# Patient Record
Sex: FEMALE | Race: ASIAN | NOT HISPANIC OR LATINO | URBAN - METROPOLITAN AREA
[De-identification: names, ages, dates, MRNs, and addresses within clinical notes are randomized per-mention and may not be internally consistent; named-entity substitution may affect disease eponyms.]

---

## 2023-05-03 ENCOUNTER — INPATIENT (INPATIENT)
Facility: HOSPITAL | Age: 78
LOS: 1 days | Discharge: HOME | DRG: 203 | End: 2023-05-05
Attending: STUDENT IN AN ORGANIZED HEALTH CARE EDUCATION/TRAINING PROGRAM | Admitting: STUDENT IN AN ORGANIZED HEALTH CARE EDUCATION/TRAINING PROGRAM
Payer: MEDICARE

## 2023-05-03 VITALS
OXYGEN SATURATION: 98 % | SYSTOLIC BLOOD PRESSURE: 154 MMHG | TEMPERATURE: 98 F | DIASTOLIC BLOOD PRESSURE: 80 MMHG | HEART RATE: 76 BPM | RESPIRATION RATE: 20 BRPM

## 2023-05-03 DIAGNOSIS — R06.00 DYSPNEA, UNSPECIFIED: ICD-10-CM

## 2023-05-03 LAB
ALBUMIN SERPL ELPH-MCNC: 4.4 G/DL — SIGNIFICANT CHANGE UP (ref 3.5–5.2)
ALP SERPL-CCNC: 49 U/L — SIGNIFICANT CHANGE UP (ref 30–115)
ALT FLD-CCNC: 27 U/L — SIGNIFICANT CHANGE UP (ref 0–41)
ANION GAP SERPL CALC-SCNC: 12 MMOL/L — SIGNIFICANT CHANGE UP (ref 7–14)
AST SERPL-CCNC: 30 U/L — SIGNIFICANT CHANGE UP (ref 0–41)
BASOPHILS # BLD AUTO: 0.07 K/UL — SIGNIFICANT CHANGE UP (ref 0–0.2)
BASOPHILS NFR BLD AUTO: 1 % — SIGNIFICANT CHANGE UP (ref 0–1)
BILIRUB SERPL-MCNC: 0.4 MG/DL — SIGNIFICANT CHANGE UP (ref 0.2–1.2)
BUN SERPL-MCNC: 21 MG/DL — HIGH (ref 10–20)
CALCIUM SERPL-MCNC: 9.3 MG/DL — SIGNIFICANT CHANGE UP (ref 8.4–10.4)
CHLORIDE SERPL-SCNC: 104 MMOL/L — SIGNIFICANT CHANGE UP (ref 98–110)
CO2 SERPL-SCNC: 24 MMOL/L — SIGNIFICANT CHANGE UP (ref 17–32)
CREAT SERPL-MCNC: 0.8 MG/DL — SIGNIFICANT CHANGE UP (ref 0.7–1.5)
EGFR: 75 ML/MIN/1.73M2 — SIGNIFICANT CHANGE UP
EOSINOPHIL # BLD AUTO: 0.33 K/UL — SIGNIFICANT CHANGE UP (ref 0–0.7)
EOSINOPHIL NFR BLD AUTO: 4.5 % — SIGNIFICANT CHANGE UP (ref 0–8)
FLUAV AG NPH QL: SIGNIFICANT CHANGE UP
FLUBV AG NPH QL: SIGNIFICANT CHANGE UP
GLUCOSE SERPL-MCNC: 165 MG/DL — HIGH (ref 70–99)
HCT VFR BLD CALC: 34.6 % — LOW (ref 37–47)
HGB BLD-MCNC: 11.6 G/DL — LOW (ref 12–16)
IMM GRANULOCYTES NFR BLD AUTO: 0.3 % — SIGNIFICANT CHANGE UP (ref 0.1–0.3)
LYMPHOCYTES # BLD AUTO: 1.7 K/UL — SIGNIFICANT CHANGE UP (ref 1.2–3.4)
LYMPHOCYTES # BLD AUTO: 23.3 % — SIGNIFICANT CHANGE UP (ref 20.5–51.1)
MCHC RBC-ENTMCNC: 31.5 PG — HIGH (ref 27–31)
MCHC RBC-ENTMCNC: 33.5 G/DL — SIGNIFICANT CHANGE UP (ref 32–37)
MCV RBC AUTO: 94 FL — SIGNIFICANT CHANGE UP (ref 81–99)
MONOCYTES # BLD AUTO: 0.61 K/UL — HIGH (ref 0.1–0.6)
MONOCYTES NFR BLD AUTO: 8.3 % — SIGNIFICANT CHANGE UP (ref 1.7–9.3)
NEUTROPHILS # BLD AUTO: 4.58 K/UL — SIGNIFICANT CHANGE UP (ref 1.4–6.5)
NEUTROPHILS NFR BLD AUTO: 62.6 % — SIGNIFICANT CHANGE UP (ref 42.2–75.2)
NRBC # BLD: 0 /100 WBCS — SIGNIFICANT CHANGE UP (ref 0–0)
NT-PROBNP SERPL-SCNC: 220 PG/ML — SIGNIFICANT CHANGE UP (ref 0–300)
PLATELET # BLD AUTO: 237 K/UL — SIGNIFICANT CHANGE UP (ref 130–400)
PMV BLD: 10 FL — SIGNIFICANT CHANGE UP (ref 7.4–10.4)
POTASSIUM SERPL-MCNC: 4.2 MMOL/L — SIGNIFICANT CHANGE UP (ref 3.5–5)
POTASSIUM SERPL-SCNC: 4.2 MMOL/L — SIGNIFICANT CHANGE UP (ref 3.5–5)
PROT SERPL-MCNC: 7.1 G/DL — SIGNIFICANT CHANGE UP (ref 6–8)
RBC # BLD: 3.68 M/UL — LOW (ref 4.2–5.4)
RBC # FLD: 13.2 % — SIGNIFICANT CHANGE UP (ref 11.5–14.5)
RSV RNA NPH QL NAA+NON-PROBE: SIGNIFICANT CHANGE UP
SARS-COV-2 RNA SPEC QL NAA+PROBE: SIGNIFICANT CHANGE UP
SODIUM SERPL-SCNC: 140 MMOL/L — SIGNIFICANT CHANGE UP (ref 135–146)
TROPONIN T SERPL-MCNC: <0.01 NG/ML — SIGNIFICANT CHANGE UP
WBC # BLD: 7.31 K/UL — SIGNIFICANT CHANGE UP (ref 4.8–10.8)
WBC # FLD AUTO: 7.31 K/UL — SIGNIFICANT CHANGE UP (ref 4.8–10.8)

## 2023-05-03 PROCEDURE — 36415 COLL VENOUS BLD VENIPUNCTURE: CPT

## 2023-05-03 PROCEDURE — 97162 PT EVAL MOD COMPLEX 30 MIN: CPT | Mod: GP

## 2023-05-03 PROCEDURE — 94640 AIRWAY INHALATION TREATMENT: CPT

## 2023-05-03 PROCEDURE — 93306 TTE W/DOPPLER COMPLETE: CPT

## 2023-05-03 PROCEDURE — 71045 X-RAY EXAM CHEST 1 VIEW: CPT | Mod: 26

## 2023-05-03 PROCEDURE — 80053 COMPREHEN METABOLIC PANEL: CPT

## 2023-05-03 PROCEDURE — 84484 ASSAY OF TROPONIN QUANT: CPT

## 2023-05-03 PROCEDURE — 85025 COMPLETE CBC W/AUTO DIFF WBC: CPT

## 2023-05-03 PROCEDURE — 84145 PROCALCITONIN (PCT): CPT

## 2023-05-03 PROCEDURE — 83735 ASSAY OF MAGNESIUM: CPT

## 2023-05-03 PROCEDURE — 99285 EMERGENCY DEPT VISIT HI MDM: CPT

## 2023-05-03 PROCEDURE — 71275 CT ANGIOGRAPHY CHEST: CPT | Mod: 26,MA

## 2023-05-03 RX ORDER — IPRATROPIUM/ALBUTEROL SULFATE 18-103MCG
3 AEROSOL WITH ADAPTER (GRAM) INHALATION
Refills: 0 | Status: DISCONTINUED | OUTPATIENT
Start: 2023-05-03 | End: 2023-05-05

## 2023-05-03 RX ADMIN — Medication 125 MILLIGRAM(S): at 20:01

## 2023-05-03 RX ADMIN — Medication 3 MILLILITER(S): at 21:12

## 2023-05-03 RX ADMIN — Medication 3 MILLILITER(S): at 20:01

## 2023-05-03 NOTE — ED PROVIDER NOTE - PHYSICAL EXAMINATION
CONSTITUTIONAL: Well-appearing; well-nourished; in no apparent distress.   EYES: PERRL; EOM intact.   CARDIOVASCULAR: Normal S1, S2; no murmurs, rubs, or gallops.   RESPIRATORY: Diffuse bilateral expiratory wheezing.  Respiratory rate 16.  No accessory muscle use.  GI/: Normal bowel sounds; non-distended; non-tender; no palpable organomegaly.   MS: No calf swelling and tenderness.  No pitting edema to lower extremities.  SKIN: Normal for age and race; warm; dry; good turgor; no apparent lesions or exudate.   NEURO/PSYCH: A & O x 4; grossly unremarkable.

## 2023-05-03 NOTE — ED PROVIDER NOTE - OBJECTIVE STATEMENT
77 years old female history of hypertension, high cholesterol present complaint worsening loss of breath and coughing over the past 10 days.  Per son, patient has been having coughing for months and had an outpatient CT scan of chest about 5 months ago which fine bilateral apical granulomatosis and an enlarged pericarina lymph node 1.2cm.  Patient was taking albuterol nebulizer at home over the past 10 days with no improvement of shortness of breath so son brought patient to ED for evaluation.  Son also reports productive clear sputum.  Otherwise denies fever, chills, chest pain, abdominal pain, nausea, vomiting, diarrhea or urinary symptoms.

## 2023-05-03 NOTE — ED ADULT NURSE NOTE - OBJECTIVE STATEMENT
Pt is a 77 year old female c/o worsening SOB. Pts family member states she has been feeling SOB for a couple of months but it got worse today/ Pt is sitting comfortably in bed in no acute distress. Family at bedside

## 2023-05-03 NOTE — ED PROVIDER NOTE - CLINICAL SUMMARY MEDICAL DECISION MAKING FREE TEXT BOX
Laboratory results reviewed and discussed with patient. CBC shows normal WBC count, Hb/Hct and plateelet count are WNL. BMP shows electrolytes WNL and no YULI. Troponin is negative.  EKG shows sinus rhythm, intervals are in acceptable range, no significant ST/T wave changes noted.   CXR shows no PTX, no effusion, no pneumonia, chronic changes.   Patient with TORRES with minimal exertion, like moving around in the stretcher or attempting to walk few steps, which has been worse over the last week and even more worse today, so had come to ED for evaluation.   Patient remained hemodynamically stable during the course of ED stay. Discussed with patient about the results of the diagnostic studies. Discussed with admitting physician and MAR, patient is admitted to Medicine for further evaluation and care.

## 2023-05-03 NOTE — ED PROVIDER NOTE - ATTENDING APP SHARED VISIT CONTRIBUTION OF CARE
77-year-old female history of hypertension, hyperlipidemia, recently being treated for asthma with montelukast by her family medicine provider presenting today with shortness of breath.  Patient has had a cough for over 3 months, endorses exertional shortness of breath for the last 2 to 3 weeks however shortness of breath worsened acutely this evening.  Denies chest pain, denies any fevers.  Denies exertional chest pain.  Denies smoking.    CONSTITUTIONAL: Well-developed; well-nourished; in no acute distress.   SKIN: warm, dry  HEAD: Normocephalic; atraumatic.  EYES: PERRL, EOMI, no conjunctival erythema  ENT: No nasal discharge; airway clear.  NECK: Supple; non tender.  CARD: S1, S2 normal; no murmurs, gallops, or rubs. Regular rate and rhythm.   RESP: normal work of breathing, expiratory wheezing faintly. LUQ crackles noted.   ABD: soft ntnd.  EXT: Normal ROM.  No clubbing, cyanosis or edema.   NEURO: Alert, oriented, grossly unremarkable.  PSYCH: Cooperative, appropriate.

## 2023-05-04 PROCEDURE — 93306 TTE W/DOPPLER COMPLETE: CPT | Mod: 26

## 2023-05-04 PROCEDURE — 99222 1ST HOSP IP/OBS MODERATE 55: CPT

## 2023-05-04 RX ORDER — BUDESONIDE AND FORMOTEROL FUMARATE DIHYDRATE 160; 4.5 UG/1; UG/1
2 AEROSOL RESPIRATORY (INHALATION)
Refills: 0 | Status: DISCONTINUED | OUTPATIENT
Start: 2023-05-04 | End: 2023-05-04

## 2023-05-04 RX ORDER — IPRATROPIUM/ALBUTEROL SULFATE 18-103MCG
3 AEROSOL WITH ADAPTER (GRAM) INHALATION EVERY 6 HOURS
Refills: 0 | Status: DISCONTINUED | OUTPATIENT
Start: 2023-05-04 | End: 2023-05-05

## 2023-05-04 RX ORDER — MONTELUKAST 4 MG/1
1 TABLET, CHEWABLE ORAL
Refills: 0 | DISCHARGE

## 2023-05-04 RX ORDER — ACETAMINOPHEN 500 MG
650 TABLET ORAL EVERY 6 HOURS
Refills: 0 | Status: DISCONTINUED | OUTPATIENT
Start: 2023-05-04 | End: 2023-05-05

## 2023-05-04 RX ORDER — LANOLIN ALCOHOL/MO/W.PET/CERES
3 CREAM (GRAM) TOPICAL AT BEDTIME
Refills: 0 | Status: DISCONTINUED | OUTPATIENT
Start: 2023-05-04 | End: 2023-05-05

## 2023-05-04 RX ORDER — GUAIFENESIN/DEXTROMETHORPHAN 600MG-30MG
10 TABLET, EXTENDED RELEASE 12 HR ORAL EVERY 6 HOURS
Refills: 0 | Status: DISCONTINUED | OUTPATIENT
Start: 2023-05-04 | End: 2023-05-05

## 2023-05-04 RX ORDER — ATORVASTATIN CALCIUM 80 MG/1
20 TABLET, FILM COATED ORAL AT BEDTIME
Refills: 0 | Status: DISCONTINUED | OUTPATIENT
Start: 2023-05-04 | End: 2023-05-05

## 2023-05-04 RX ORDER — ENOXAPARIN SODIUM 100 MG/ML
40 INJECTION SUBCUTANEOUS EVERY 24 HOURS
Refills: 0 | Status: DISCONTINUED | OUTPATIENT
Start: 2023-05-04 | End: 2023-05-05

## 2023-05-04 RX ORDER — ATENOLOL 25 MG/1
12.5 TABLET ORAL DAILY
Refills: 0 | Status: DISCONTINUED | OUTPATIENT
Start: 2023-05-04 | End: 2023-05-05

## 2023-05-04 RX ORDER — LOSARTAN POTASSIUM 100 MG/1
50 TABLET, FILM COATED ORAL DAILY
Refills: 0 | Status: DISCONTINUED | OUTPATIENT
Start: 2023-05-04 | End: 2023-05-05

## 2023-05-04 RX ORDER — ONDANSETRON 8 MG/1
4 TABLET, FILM COATED ORAL EVERY 8 HOURS
Refills: 0 | Status: DISCONTINUED | OUTPATIENT
Start: 2023-05-04 | End: 2023-05-05

## 2023-05-04 RX ORDER — OLMESARTAN MEDOXOMIL 5 MG/1
1 TABLET, FILM COATED ORAL
Refills: 0 | DISCHARGE

## 2023-05-04 RX ORDER — ATENOLOL 25 MG/1
0.5 TABLET ORAL
Refills: 0 | DISCHARGE

## 2023-05-04 RX ORDER — ATORVASTATIN CALCIUM 80 MG/1
1 TABLET, FILM COATED ORAL
Refills: 0 | DISCHARGE

## 2023-05-04 RX ADMIN — ATENOLOL 12.5 MILLIGRAM(S): 25 TABLET ORAL at 05:39

## 2023-05-04 RX ADMIN — Medication 60 MILLIGRAM(S): at 05:40

## 2023-05-04 RX ADMIN — Medication 100 MILLIGRAM(S): at 13:14

## 2023-05-04 RX ADMIN — ENOXAPARIN SODIUM 40 MILLIGRAM(S): 100 INJECTION SUBCUTANEOUS at 13:15

## 2023-05-04 RX ADMIN — Medication 100 MILLIGRAM(S): at 21:19

## 2023-05-04 RX ADMIN — LOSARTAN POTASSIUM 50 MILLIGRAM(S): 100 TABLET, FILM COATED ORAL at 05:39

## 2023-05-04 RX ADMIN — ATORVASTATIN CALCIUM 20 MILLIGRAM(S): 80 TABLET, FILM COATED ORAL at 21:18

## 2023-05-04 RX ADMIN — Medication 3 MILLILITER(S): at 20:43

## 2023-05-04 NOTE — PATIENT PROFILE ADULT - FALL HARM RISK - HARM RISK INTERVENTIONS

## 2023-05-04 NOTE — H&P ADULT - NSHPREVIEWOFSYSTEMS_GEN_ALL_CORE
Dialect not interpretable by  REVIEW OF SYSTEMS:    CONSTITUTIONAL:  No weakness, fevers or chills  EYES/ENT:  No visual changes;  No vertigo or throat pain   NECK:  No pain or stiffness  RESPIRATORY:  + shortness of breath, + wheeze  CARDIOVASCULAR:  No chest pain or palpitations  GASTROINTESTINAL:  No abdominal or epigastric pain. No nausea, vomiting, or hematemesis; No diarrhea or constipation. No melena or hematochezia.  GENITOURINARY:  No dysuria, frequency or hematuria  NEUROLOGICAL:  No numbness or weakness  SKIN:  No itching, rashes

## 2023-05-04 NOTE — H&P ADULT - HISTORY OF PRESENT ILLNESS
77-year-old female    PMH:  history of hypertension, hyperlipidemia, recently being treated for asthma with montelukast     Presenting today with shortness of breath.      Patient has had a cough for over 3 months, endorses exertional shortness of breath for the last 2 to 3 weeks however shortness of breath worsened acutely this evening.  Denies chest pain, denies any fevers.  Denies exertional chest pain.  Denies smoking    In the ED :  BP: 154/80  HR : 76  RR : 20  T : 98  O2: 98% on 2L NC    Trop <0.01x1   BNP : 220  RVP : neg     CT Angio :   No evidence of acute pulmonary embolism.  Bilateral upper lobe opacities and pleural thickening, which may correlate to scarring and infectious etiology, such as old granulomatous disease.  Mildly dilated upper to mid esophagus, may consider correlation with direct visualization.    ECG: Normal sinus rhythm  Right axis deviation  Pulmonary disease pattern  Possible Septal infarct , age undetermined    Attempted to call son - not available by phone    77-year-old female    PMH:  history of hypertension, hyperlipidemia, asthma     Presenting today with shortness of breath.      Patient has had a cough for over 3 months, endorses exertional shortness of breath for the last 2 to 3 weeks however shortness of breath worsened acutely this evening.  Denies chest pain, denies any fevers.  Denies exertional chest pain.  Denies smoking    In the ED :  BP: 154/80  HR : 76  RR : 20  T : 98  O2: 98% on 2L NC    Trop <0.01x1   BNP : 220  RVP : neg     CT Angio :   No evidence of acute pulmonary embolism.  Bilateral upper lobe opacities and pleural thickening, which may correlate to scarring and infectious etiology, such as old granulomatous disease.  Mildly dilated upper to mid esophagus, may consider correlation with direct visualization.    ECG: Normal sinus rhythm  Right axis deviation  Pulmonary disease pattern  Possible Septal infarct , age undetermined    Attempted to call son - not available by phone

## 2023-05-04 NOTE — CONSULT NOTE ADULT - ASSESSMENT
IMPRESSION    Acute bronchitis    RECOMMENDATIONS    CT shows some B/l upper lobe scarring, this is not related to her symptoms.  She is 94% on RA.  If her cough is bothering can give her anti-tussive medications such as benzonatate.  If her SOB gets worse and she requires oxygen evaluate for PNA; right now there is none.  DVT PPX.  Recall PRN. IMPRESSION      Upper lobe scarring; pleural thickening on CT   Cough  Shortness of breath with exertion   History of asthma     RECOMMENDATIONS    CT shows some B/l upper lobe scarring, likely chronic; outpatient CT in 6 months.   She is 94% on RA after ambulation; no need for O2  She has a cough; CT with no consolidation; check procal; observe off abx for now   Recommend symbicort 80 as needed if she has a history of asthma; rinse mouth after use   Can use benzonatate for the cough  DVT ppx; early ambulation  No PE on CTA  Recall PRN  IMPRESSION      Upper lobe scarring; pleural thickening on CT   Cough  Shortness of breath with exertion   History of asthma     RECOMMENDATIONS    CT shows some B/l upper lobe scarring, likely chronic; outpatient CT in 6 months.   She is 94% on RA after ambulation; no need for O2  She has a cough; CT with no consolidation; check procal; observe off abx for now   Recommend symbicort 80 as needed if she has a history of asthma; rinse mouth after use   Can use benzonatate for the cough  Rule out GERD; post nasal drip at the cause of cough  DVT ppx; early ambulation  No PE on CTA  Recall PRN

## 2023-05-04 NOTE — CONSULT NOTE ADULT - ATTENDING COMMENTS
IMPRESSION      Upper lobe scarring; pleural thickening on CT   Cough  Shortness of breath with exertion   History of asthma     Impression and plan above are my own.

## 2023-05-04 NOTE — H&P ADULT - ATTENDING COMMENTS
my note supersedes residents incase of discrepancy    77-year-old female    with Hx of Membranous GN, hypertension, hyperlipidemia, asthma, presented with SOB for few weeks and cough for past few days    pt is mandarin speaker, interpretation used, ID [id number]     #AHRF: was on NC currenlty on RA  #acute asthma exacerbation 2/2 viral bronchitis   #CT angio Bilateral upper lobe opacities and pleural thickening, which may   correlate to scarring and infectious etiology, such as old granulomatous   disease.  - c/w w steroid and Nebs  - on RA can be dc today, requestoing to [facility] one more day given TORRES  - pending Pulm eval  - BNP wnl, exam not concerning for fluid overload  - check Procal     # HT/ HLD   c/w home meds    # DVT : Lovenox   # Diet : DASH  # Activity : [facility] - PT eval   # Dispo : From home  # Code : Full my note supersedes residents incase of discrepancy    77-year-old female    with Hx of Membranous GN, hypertension, hyperlipidemia, asthma, presented with SOB for few weeks and cough for past few days    pt is mandarin speaker, interpretation used, ID [id number]     #AHRF: was on NC currenlty on RA  #acute asthma exacerbation 2/2 viral bronchitis   #CT angio Bilateral upper lobe opacities and pleural thickening, which may   correlate to scarring and infectious etiology, such as old granulomatous   disease.  - c/w w steroid and Nebs  - on RA can be dc today, requestoing to [facility] one more day given TORRES  - pending Pulm eval  - BNP wnl, exam not concerning for fluid overload  - check Procal     #mild dilation of esophagus on CT scan: outpt eval by GI   # HTN/ HLD   c/w home meds    # DVT : Lovenox   # Diet : DASH  # Activity : [facility] - PT eval   # Dispo : From home  # Code : Full

## 2023-05-04 NOTE — PHYSICAL THERAPY INITIAL EVALUATION ADULT - PERTINENT HX OF CURRENT PROBLEM, REHAB EVAL
· Assessment    77-year-old female    PMH:  history of hypertension, hyperlipidemia, recently being treated for asthma with montelukast     Presenting today with shortness of breath.     Pt. referred to PT for eval and tx.

## 2023-05-04 NOTE — CONSULT NOTE ADULT - CONSULT REASON
new onset SOB   CT- chest : Bilateral upper lobe opacities and pleural thickening, which may correlate to scarring and infectious etiology, such as old granulomatous disease.

## 2023-05-04 NOTE — PHYSICAL THERAPY INITIAL EVALUATION ADULT - GENERAL OBSERVATIONS, REHAB EVAL
[id number] am Chart reviewed. Pt. seen semirecline in bed , + O2 at 2L/min nc , IV lock, used Mandarin  [id number], Pt. agreed to activity/therapy.

## 2023-05-04 NOTE — H&P ADULT - ASSESSMENT
77-year-old female    PMH:  history of hypertension, hyperlipidemia, recently being treated for asthma with montelukast     Presenting today with shortness of breath.      # SOB  # Hx of Asthma - possible exacerbation   # Granulomatous disease on CT   - O2: 98% on 2L NC  - Trop <0.01x1 - trend , BNP : 220  - RVP : neg   - CT Angio : No evidence of acute pulmonary embolism.  - Bilateral upper lobe opacities and pleural thickening, which may correlate to scarring and infectious etiology, such as old granulomatous disease.  - Mildly dilated upper to mid esophagus, may consider correlation with direct visualization.  - ECG: Pulmonary disease pattern  - denies smoking     - Duoneb q6  - Solumedrol 60 IV daily   - f/u Pulm reccs     # HT/ HLD   c/w home meds    # DVT : Lovenox   # Diet : DASH  # Activity : [facility] - PT eval   # Dispo : From home  # Code : Full           77-year-old female    PMH:  history of hypertension, hyperlipidemia, recently being treated for asthma with montelukast     Presenting today with shortness of breath.      # SOB  # Hx of Asthma - possible exacerbation   # Granulomatous disease on CT   - O2: 98% on 2L NC  - Trop <0.01x1 - trend , BNP : 220  - RVP : neg   - CT Angio : No evidence of acute pulmonary embolism.  - Bilateral upper lobe opacities and pleural thickening, which may correlate to scarring and infectious etiology, such as old granulomatous disease.  - Mildly dilated upper to mid esophagus, may consider correlation with direct visualization.  - ECG: Pulmonary disease pattern  - denies smoking     - Duoneb q6   - Solumedrol 60 IV daily   - f/u TTE  - f/u Pulm reccs     # HT/ HLD   c/w home meds    # DVT : Lovenox   # Diet : DASH  # Activity : [facility] - PT eval   # Dispo : From home  # Code : Full

## 2023-05-04 NOTE — H&P ADULT - NSHPPHYSICALEXAM_GEN_ALL_CORE
CONSTITUTIONAL: Well-developed; well-nourished; in no acute distress.   SKIN: warm, dry  HEAD: Normocephalic; atraumatic.  EYES: PERRL, EOMI, no conjunctival erythema  ENT: No nasal discharge; airway clear.  NECK: Supple; non tender.  CARD: S1, S2 normal; no murmurs, gallops, or rubs. Regular rate and rhythm.   RESP: normal work of breathing, expiratory wheezing faintly. LUQ crackles noted.   ABD: soft ntnd.  EXT: Normal ROM.  No clubbing, cyanosis or edema.   NEURO: Alert, oriented, grossly unremarkable.  PSYCH: Cooperative, appropriate. CONSTITUTIONAL: Well-developed; well-nourished; in no acute distress.   SKIN: warm, dry  HEAD: Normocephalic; atraumatic.  EYES: PERRL, EOMI, no conjunctival erythema  ENT: No nasal discharge; airway clear.  NECK: Supple; non tender.  CARD: S1, S2 normal; no murmurs, gallops, or rubs. Regular rate and rhythm.   RESP: normal work of breathing, expiratory wheezing faintly.   ABD: soft ntnd.  EXT: Normal ROM.  No clubbing, cyanosis or edema.   NEURO: Alert, oriented, grossly unremarkable.  PSYCH: Cooperative, appropriate.

## 2023-05-04 NOTE — CONSULT NOTE ADULT - SUBJECTIVE AND OBJECTIVE BOX
Patient is a 77y old  Female who presents with a chief complaint of TORRES (04 May 2023 03:08)      HPI:  77-year-old female    PMH:  history of hypertension, hyperlipidemia, asthma     Presenting today with shortness of breath.      Patient has had a cough for over 3 months, endorses exertional shortness of breath for the last 2 to 3 weeks however shortness of breath worsened acutely this evening.  Denies chest pain, denies any fevers.  Denies exertional chest pain.  Denies smoking    In the ED :  BP: 154/80  HR : 76  RR : 20  T : 98  O2: 98% on 2L NC    Trop <0.01x1   BNP : 220  RVP : neg     CT Angio :   No evidence of acute pulmonary embolism.  Bilateral upper lobe opacities and pleural thickening, which may correlate to scarring and infectious etiology, such as old granulomatous disease.  Mildly dilated upper to mid esophagus, may consider correlation with direct visualization.    ECG: Normal sinus rhythm  Right axis deviation  Pulmonary disease pattern  Possible Septal infarct , age undetermined    Attempted to call son - not available by phone    (04 May 2023 03:08)      PAST MEDICAL & SURGICAL HISTORY:  HTN (hypertension)          SOCIAL HX:   Smoking          never    FAMILY HISTORY:  .  No cardiovascular or pulmonary family history     REVIEW OF SYSTEMS:    All ROS are negative except per HPI     Allergies    penicillin (Rash; Urticaria)    Intolerances          PHYSICAL EXAM  Vital Signs Last 24 Hrs  T(C): 36.4 (04 May 2023 04:55), Max: 36.8 (03 May 2023 19:08)  T(F): 97.6 (04 May 2023 04:55), Max: 98.3 (03 May 2023 19:08)  HR: 83 (04 May 2023 04:55) (76 - 87)  BP: 143/69 (04 May 2023 04:55) (140/63 - 154/80)  RR: 18 (04 May 2023 04:55) (18 - 20)  SpO2: 96% (04 May 2023 04:55) (96% - 98%)    Parameters below as of 04 May 2023 04:55  Patient On (Oxygen Delivery Method): nasal cannula        CONSTITUTIONAL:  Well nourished.  NAD    ENT:   Airway patent,     EYES:   Clear bilaterally,   pupils equal,   round and reactive to light.    CARDIAC:   Normal rate,   regular rhythm.    no edema    RESPIRATORY:   No wheezing  Normal chest expansion  Not tachypneic,  No use of accessory muscles    GASTROINTESTINAL:  Abdomen soft,   non-tender,   no guarding,   + BS    MUSCULOSKELETAL:   range of motion is not limited,  no clubbing, cyanosis    NEUROLOGICAL:   Alert and oriented   no motor deficits.    SKIN:   No evidence of rash.    PSYCHIATRIC:   normal mood and affect.   no apparent risk to self or others.        LABS:                          11.6   7.31  )-----------( 237      ( 03 May 2023 20:11 )             34.6                                               05-03    140  |  104  |  21<H>  ----------------------------<  165<H>  4.2   |  24  |  0.8    Ca    9.3      03 May 2023 20:11    TPro  7.1  /  Alb  4.4  /  TBili  0.4  /  DBili  x   /  AST  30  /  ALT  27  /  AlkPhos  49  05-03                                                 CARDIAC MARKERS ( 03 May 2023 20:11 )  x     / <0.01 ng/mL / x     / x     / x                                                LIVER FUNCTIONS - ( 03 May 2023 20:11 )  Alb: 4.4 g/dL / Pro: 7.1 g/dL / ALK PHOS: 49 U/L / ALT: 27 U/L / AST: 30 U/L / GGT: x                                                                                                MEDICATIONS  (STANDING):  albuterol/ipratropium for Nebulization 3 milliLiter(s) Nebulizer every 6 hours  albuterol/ipratropium for Nebulization.. 3 milliLiter(s) Nebulizer every 20 minutes  atenolol  Tablet 12.5 milliGRAM(s) Oral daily  atorvastatin 20 milliGRAM(s) Oral at bedtime  enoxaparin Injectable 40 milliGRAM(s) SubCutaneous every 24 hours  losartan 50 milliGRAM(s) Oral daily  methylPREDNISolone sodium succinate Injectable 60 milliGRAM(s) IV Push daily    MEDICATIONS  (PRN):  acetaminophen     Tablet .. 650 milliGRAM(s) Oral every 6 hours PRN Temp greater or equal to 38C (100.4F), Mild Pain (1 - 3)  aluminum hydroxide/magnesium hydroxide/simethicone Suspension 30 milliLiter(s) Oral every 4 hours PRN Dyspepsia  melatonin 3 milliGRAM(s) Oral at bedtime PRN Insomnia  ondansetron Injectable 4 milliGRAM(s) IV Push every 8 hours PRN Nausea and/or Vomiting

## 2023-05-04 NOTE — H&P ADULT - NSHPLABSRESULTS_GEN_ALL_CORE
(05-03 @ 20:11)                      11.6  7.31 )-----------( 237                 34.6    Neutrophils = 4.58 (62.6%)  Lymphocytes = 1.70 (23.3%)  Eosinophils = 0.33 (4.5%)  Basophils = 0.07 (1.0%)  Monocytes = 0.61 (8.3%)  Bands = --%    05-03    140  |  104  |  21<H>  ----------------------------<  165<H>  4.2   |  24  |  0.8    Ca    9.3      03 May 2023 20:11    TPro  7.1  /  Alb  4.4  /  TBili  0.4  /  DBili  x   /  AST  30  /  ALT  27  /  AlkPhos  49  05-03      CARDIAC MARKERS ( 03 May 2023 20:11 )  Trop <0.01 ng/mL / CK x     / CKMB x

## 2023-05-05 ENCOUNTER — TRANSCRIPTION ENCOUNTER (OUTPATIENT)
Age: 78
End: 2023-05-05

## 2023-05-05 VITALS
HEART RATE: 75 BPM | SYSTOLIC BLOOD PRESSURE: 115 MMHG | OXYGEN SATURATION: 97 % | DIASTOLIC BLOOD PRESSURE: 56 MMHG | RESPIRATION RATE: 19 BRPM | TEMPERATURE: 98 F

## 2023-05-05 LAB
ALBUMIN SERPL ELPH-MCNC: 4.4 G/DL — SIGNIFICANT CHANGE UP (ref 3.5–5.2)
ALP SERPL-CCNC: 50 U/L — SIGNIFICANT CHANGE UP (ref 30–115)
ALT FLD-CCNC: 23 U/L — SIGNIFICANT CHANGE UP (ref 0–41)
ANION GAP SERPL CALC-SCNC: 11 MMOL/L — SIGNIFICANT CHANGE UP (ref 7–14)
AST SERPL-CCNC: 25 U/L — SIGNIFICANT CHANGE UP (ref 0–41)
BASOPHILS # BLD AUTO: 0.03 K/UL — SIGNIFICANT CHANGE UP (ref 0–0.2)
BASOPHILS NFR BLD AUTO: 0.2 % — SIGNIFICANT CHANGE UP (ref 0–1)
BILIRUB SERPL-MCNC: 0.8 MG/DL — SIGNIFICANT CHANGE UP (ref 0.2–1.2)
BUN SERPL-MCNC: 28 MG/DL — HIGH (ref 10–20)
CALCIUM SERPL-MCNC: 9 MG/DL — SIGNIFICANT CHANGE UP (ref 8.4–10.5)
CHLORIDE SERPL-SCNC: 106 MMOL/L — SIGNIFICANT CHANGE UP (ref 98–110)
CO2 SERPL-SCNC: 24 MMOL/L — SIGNIFICANT CHANGE UP (ref 17–32)
CREAT SERPL-MCNC: 0.8 MG/DL — SIGNIFICANT CHANGE UP (ref 0.7–1.5)
EGFR: 75 ML/MIN/1.73M2 — SIGNIFICANT CHANGE UP
EOSINOPHIL # BLD AUTO: 0 K/UL — SIGNIFICANT CHANGE UP (ref 0–0.7)
EOSINOPHIL NFR BLD AUTO: 0 % — SIGNIFICANT CHANGE UP (ref 0–8)
GLUCOSE SERPL-MCNC: 107 MG/DL — HIGH (ref 70–99)
HCT VFR BLD CALC: 35.1 % — LOW (ref 37–47)
HGB BLD-MCNC: 11.6 G/DL — LOW (ref 12–16)
IMM GRANULOCYTES NFR BLD AUTO: 0.5 % — HIGH (ref 0.1–0.3)
LYMPHOCYTES # BLD AUTO: 1.72 K/UL — SIGNIFICANT CHANGE UP (ref 1.2–3.4)
LYMPHOCYTES # BLD AUTO: 8.7 % — LOW (ref 20.5–51.1)
MAGNESIUM SERPL-MCNC: 2.3 MG/DL — SIGNIFICANT CHANGE UP (ref 1.8–2.4)
MCHC RBC-ENTMCNC: 31.9 PG — HIGH (ref 27–31)
MCHC RBC-ENTMCNC: 33 G/DL — SIGNIFICANT CHANGE UP (ref 32–37)
MCV RBC AUTO: 96.4 FL — SIGNIFICANT CHANGE UP (ref 81–99)
MONOCYTES # BLD AUTO: 0.37 K/UL — SIGNIFICANT CHANGE UP (ref 0.1–0.6)
MONOCYTES NFR BLD AUTO: 1.9 % — SIGNIFICANT CHANGE UP (ref 1.7–9.3)
NEUTROPHILS # BLD AUTO: 17.44 K/UL — HIGH (ref 1.4–6.5)
NEUTROPHILS NFR BLD AUTO: 88.7 % — HIGH (ref 42.2–75.2)
NRBC # BLD: 0 /100 WBCS — SIGNIFICANT CHANGE UP (ref 0–0)
PLATELET # BLD AUTO: 261 K/UL — SIGNIFICANT CHANGE UP (ref 130–400)
PMV BLD: 10.1 FL — SIGNIFICANT CHANGE UP (ref 7.4–10.4)
POTASSIUM SERPL-MCNC: 5.1 MMOL/L — HIGH (ref 3.5–5)
POTASSIUM SERPL-SCNC: 5.1 MMOL/L — HIGH (ref 3.5–5)
PROT SERPL-MCNC: 6.8 G/DL — SIGNIFICANT CHANGE UP (ref 6–8)
RBC # BLD: 3.64 M/UL — LOW (ref 4.2–5.4)
RBC # FLD: 13.6 % — SIGNIFICANT CHANGE UP (ref 11.5–14.5)
SODIUM SERPL-SCNC: 141 MMOL/L — SIGNIFICANT CHANGE UP (ref 135–146)
TROPONIN T SERPL-MCNC: <0.01 NG/ML — SIGNIFICANT CHANGE UP
WBC # BLD: 19.66 K/UL — HIGH (ref 4.8–10.8)
WBC # FLD AUTO: 19.66 K/UL — HIGH (ref 4.8–10.8)

## 2023-05-05 PROCEDURE — 99239 HOSP IP/OBS DSCHRG MGMT >30: CPT

## 2023-05-05 RX ORDER — ALBUTEROL 90 UG/1
2 AEROSOL, METERED ORAL
Qty: 1 | Refills: 0
Start: 2023-05-05

## 2023-05-05 RX ORDER — BUDESONIDE AND FORMOTEROL FUMARATE DIHYDRATE 160; 4.5 UG/1; UG/1
2 AEROSOL RESPIRATORY (INHALATION)
Qty: 1 | Refills: 0
Start: 2023-05-05 | End: 2023-05-18

## 2023-05-05 RX ORDER — ALBUTEROL 90 UG/1
2 AEROSOL, METERED ORAL
Qty: 1 | Refills: 0
Start: 2023-05-05 | End: 2023-06-03

## 2023-05-05 RX ADMIN — Medication 3 MILLILITER(S): at 07:45

## 2023-05-05 RX ADMIN — LOSARTAN POTASSIUM 50 MILLIGRAM(S): 100 TABLET, FILM COATED ORAL at 05:19

## 2023-05-05 RX ADMIN — Medication 10 MILLILITER(S): at 09:37

## 2023-05-05 RX ADMIN — Medication 3 MILLILITER(S): at 13:55

## 2023-05-05 RX ADMIN — ENOXAPARIN SODIUM 40 MILLIGRAM(S): 100 INJECTION SUBCUTANEOUS at 13:16

## 2023-05-05 RX ADMIN — ATENOLOL 12.5 MILLIGRAM(S): 25 TABLET ORAL at 05:20

## 2023-05-05 RX ADMIN — Medication 100 MILLIGRAM(S): at 13:16

## 2023-05-05 RX ADMIN — Medication 60 MILLIGRAM(S): at 05:20

## 2023-05-05 RX ADMIN — Medication 100 MILLIGRAM(S): at 05:20

## 2023-05-05 NOTE — DISCHARGE NOTE PROVIDER - CARE PROVIDERS DIRECT ADDRESSES
,DirectAddress_Unknown,DirectAddress_Unknown ,DirectAddress_Unknown,DirectAddress_Unknown,[email or web address]

## 2023-05-05 NOTE — DISCHARGE NOTE PROVIDER - NSDCCPCAREPLAN_GEN_ALL_CORE_FT
PRINCIPAL DISCHARGE DIAGNOSIS  Diagnosis: Acute dyspnea  Assessment and Plan of Treatment: You were evaluated during your hospital stay for chronic, worsening cough and shortness of breath. During your stay, you were treated with steroids, inhalers, and cough suppressants which significantly improved your symptoms. As your symptoms are suspicious for reactive airway disease (eg: asthma), you will need to follow up with the pulmonary clinic on discharge for continued care and to help prevent future hospitalizations. We are also sending you home with new medications to manage your cough and shortness of breath. Please take your medications as prescribed, and inform your primary care doctor of these new medications ans well as your recent hospitalization. Please see the pulmonary clinic in the next 7-10 days.      SECONDARY DISCHARGE DIAGNOSES  Diagnosis: GERD (gastroesophageal reflux disease)  Assessment and Plan of Treatment:     Diagnosis: Reactive airway disease  Assessment and Plan of Treatment:      PRINCIPAL DISCHARGE DIAGNOSIS  Diagnosis: Acute dyspnea  Assessment and Plan of Treatment: You were evaluated during your hospital stay for chronic, worsening cough and shortness of breath. During your stay, you were treated with steroids, inhalers, and cough suppressants which significantly improved your symptoms. As your symptoms are suspicious for reactive airway disease (eg: asthma), you will need to follow up with the pulmonary clinic on discharge for continued care and to help prevent future hospitalizations. We are also sending you home with new medications to manage your cough and shortness of breath. Please take your medications as prescribed, and inform your primary care doctor of these new medications ans well as your recent hospitalization. Please see the pulmonary clinic in the next 7-10 days.      SECONDARY DISCHARGE DIAGNOSES  Diagnosis: GERD (gastroesophageal reflux disease)  Assessment and Plan of Treatment: mild dialtion of the esophagus noted at the CT scan of the chest, follow up with GI clininc (gastroenterology)    Diagnosis: Reactive airway disease  Assessment and Plan of Treatment:      PRINCIPAL DISCHARGE DIAGNOSIS  Diagnosis: Acute dyspnea  Assessment and Plan of Treatment: You were evaluated during your hospital stay for chronic, worsening cough and shortness of breath. During your stay, you were treated with steroids, inhalers, and cough suppressants which significantly improved your symptoms. As your symptoms are suspicious for reactive airway disease (eg: asthma), you will need to follow up with the pulmonary clinic on discharge for continued care and to help prevent future hospitalizations. We are also sending you home with new medications to manage your cough and shortness of breath. Please take your medications as prescribed, and inform your primary care doctor of these new medications ans well as your recent hospitalization. Please see the pulmonary clinic in the next 7-10 days.  CT can of the lungs showed upper lobes scarring, likely chronic; needs outpatient CT in 6 months.      SECONDARY DISCHARGE DIAGNOSES  Diagnosis: GERD (gastroesophageal reflux disease)  Assessment and Plan of Treatment: mild dialtion of the esophagus noted at the CT scan of the chest, follow up with GI clininc (gastroenterology)    Diagnosis: Reactive airway disease  Assessment and Plan of Treatment:

## 2023-05-05 NOTE — DISCHARGE NOTE PROVIDER - HOSPITAL COURSE
77-year-old female history of hypertension, hyperlipidemia, asthma presenting with shortness of breath.  chronic cough for over 3 months, endorsed exertional shortness of breath for the last 2 to 3 weeks however shortness of breath worsened acutely evening prior to presentation. Non-smoker. In the ED was hemodynamically stable but requiring 2 L NC for hypoxia. Trops (-) BNP (-) RVP (-), no evidence of PE on CT angio chest but revealed bilateral upper lobe opacities and pleural thickening, which may correlate to scarring and infectious etiology, such as old granulomatous disease. ECG normal sinus Right axis deviation Pulmonary disease pattern. Patient treated with IV Solumedrol, duonebs and was weaned to room air. 94% on RA after ambulation. Pulmonary team evaluated patient for upper lobe scarring; pleural thickening on CT, likely chronic; recommending outpatient CT in 6 months. Patient medically stable and cleared for discharge on symbicort 80 as needed for possible precipitating hx of asthma, instructed to rinse mouth after use. Primary team to also send home on 5 day course of prednisone 40mg PO as patient experienced significant symptom relief after IV Solumedrol. Can use benzonatate for the cough.            77-year-old female history of hypertension, hyperlipidemia, asthma presenting with shortness of breath.  chronic cough for over 3 months, endorsed exertional shortness of breath for the last 2 to 3 weeks however shortness of breath worsened acutely evening prior to presentation. Non-smoker. In the ED was hemodynamically stable but requiring 2 L NC for hypoxia. Trops (-) BNP (-) RVP (-), no evidence of PE on CT angio chest but revealed bilateral upper lobe opacities and pleural thickening, which may correlate to scarring and infectious etiology, such as old granulomatous disease. ECG normal sinus Right axis deviation Pulmonary disease pattern. Patient treated with IV Solumedrol, duonebs and was weaned to room air. 94% on RA after ambulation. Pulmonary team evaluated patient for upper lobe scarring; pleural thickening on CT, likely chronic; recommending outpatient CT in 6 months. Patient medically stable and cleared for discharge on symbicort 80 as needed for possible precipitating hx of asthma, instructed to rinse mouth after use. Primary team to also send home on 5 day course of prednisone 40mg PO as patient experienced significant symptom relief after IV Solumedrol. Can use benzonatate for the cough.   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

## 2023-05-05 NOTE — DISCHARGE NOTE PROVIDER - NSDCMRMEDTOKEN_GEN_ALL_CORE_FT
Albuterol (Eqv-ProAir HFA) 90 mcg/inh inhalation aerosol: 2 puff(s) inhaled as needed for  shortness of breath and/or wheezing  atenolol 25 mg oral tablet: 0.5 tab(s) orally once a day  atorvastatin 20 mg oral tablet: 1 tab(s) orally once a day (at bedtime)  montelukast 10 mg oral tablet: 1 tab(s) orally once a day  olmesartan 20 mg oral tablet: 1 tab(s) orally once a day  predniSONE 20 mg oral tablet: 2 tab(s) orally once a day Stop after 5 days  Symbicort 80 mcg-4.5 mcg/inh inhalation aerosol: 2 puff(s) inhaled once a day (in the morning)   Albuterol (Eqv-ProAir HFA) 90 mcg/inh inhalation aerosol: 2 puff(s) inhaled every 6 hours as needed for  shortness of breath and/or wheezing  atenolol 25 mg oral tablet: 0.5 tab(s) orally once a day  atorvastatin 20 mg oral tablet: 1 tab(s) orally once a day (at bedtime)  montelukast 10 mg oral tablet: 1 tab(s) orally once a day  olmesartan 20 mg oral tablet: 1 tab(s) orally once a day  predniSONE 20 mg oral tablet: 2 tab(s) orally once a day Stop after 5 days  Symbicort 80 mcg-4.5 mcg/inh inhalation aerosol: 2 puff(s) inhaled 2 times a day  Symbicort 80 mcg-4.5 mcg/inh inhalation aerosol: 2 puff(s) inhaled once a day (in the morning)

## 2023-05-05 NOTE — DISCHARGE NOTE NURSING/CASE MANAGEMENT/SOCIAL WORK - PATIENT PORTAL LINK FT
You can access the FollowMyHealth Patient Portal offered by [facility] by registering at the following website: [email or web address]. By joining [facility] [employer] [facility]’s FollowMyHealth portal, you will also be able to view your health information using other applications (apps) compatible with our system.

## 2023-05-05 NOTE — DISCHARGE NOTE NURSING/CASE MANAGEMENT/SOCIAL WORK - NSDCPEFALRISK_GEN_ALL_CORE
For information on Fall & Injury Prevention, visit: [email or web address] OR  [email or web address] OR  https://www.cdc.gov/steadi/patient.html

## 2023-05-05 NOTE — DISCHARGE NOTE PROVIDER - CARE PROVIDER_API CALL
MIRELA DUKE  Internal Medicine  [address] [place] [address]  Phone: [phone]  Fax: [phone]  Follow Up Time: 1-3 days    Nanci Golden)  Critical Care Medicine; Internal Medicine; Pulmonary Disease  [address] [place] [address]  Phone: [phone]  Fax: [phone]  Follow Up Time: 1 week   MIRELA DUKE  Internal Medicine  [address] [place] [address]  Phone: [phone]  Fax: [phone]  Follow Up Time: 1-3 days    Nanci Golden)  Critical Care Medicine; Internal Medicine; Pulmonary Disease  [address] [place] [address]  Phone: [phone]  Fax: [phone]  Follow Up Time: 1 week    Mckayla Mathis)  Gastroenterology  [address] [place] [address]  Phone: [phone]  Fax: [phone]  Follow Up Time: 2 weeks

## 2023-05-05 NOTE — DISCHARGE NOTE PROVIDER - PROVIDER TOKENS
PROVIDER:[TOKEN:[[id number]],FOLLOWUP:[1-3 days]],PROVIDER:[TOKEN:[[id number]],FOLLOWUP:[1 week]] PROVIDER:[TOKEN:[[id number]],FOLLOWUP:[1-3 days]],PROVIDER:[TOKEN:[[id number]],FOLLOWUP:[1 week]],PROVIDER:[TOKEN:[[id number]],FOLLOWUP:[2 weeks]]

## 2023-05-05 NOTE — DISCHARGE NOTE PROVIDER - NSDCFUADDINST_GEN_ALL_CORE_FT
Please follow up with the pulmonary clinic at the address provided for continued management of your reactive airway cough, as well as medication refills. If Dr Cosme Fallon does not have appointments available in the next 7-10 days, you can ask to schedule your appointment with another pulmonologist in the practice.    Please also follow up with your primary care doctor in 1-3 days

## 2023-05-06 LAB — PROCALCITONIN SERPL-MCNC: 0.04 NG/ML — SIGNIFICANT CHANGE UP (ref 0.02–0.1)

## 2023-05-11 DIAGNOSIS — Z88.0 ALLERGY STATUS TO PENICILLIN: ICD-10-CM

## 2023-05-11 DIAGNOSIS — J45.901 UNSPECIFIED ASTHMA WITH (ACUTE) EXACERBATION: ICD-10-CM

## 2023-05-11 DIAGNOSIS — R05.3 CHRONIC COUGH: ICD-10-CM

## 2023-05-11 DIAGNOSIS — J98.8 OTHER SPECIFIED RESPIRATORY DISORDERS: ICD-10-CM

## 2023-05-11 DIAGNOSIS — J20.8 ACUTE BRONCHITIS DUE TO OTHER SPECIFIED ORGANISMS: ICD-10-CM

## 2023-05-11 DIAGNOSIS — K21.9 GASTRO-ESOPHAGEAL REFLUX DISEASE WITHOUT ESOPHAGITIS: ICD-10-CM

## 2023-05-11 DIAGNOSIS — E78.5 HYPERLIPIDEMIA, UNSPECIFIED: ICD-10-CM

## 2023-05-11 DIAGNOSIS — R09.82 POSTNASAL DRIP: ICD-10-CM

## 2023-05-11 DIAGNOSIS — I10 ESSENTIAL (PRIMARY) HYPERTENSION: ICD-10-CM

## 2023-11-15 PROBLEM — I10 ESSENTIAL (PRIMARY) HYPERTENSION: Chronic | Status: ACTIVE | Noted: 2023-05-04

## 2024-02-07 ENCOUNTER — APPOINTMENT (OUTPATIENT)
Dept: PULMONOLOGY | Facility: CLINIC | Age: 79
End: 2024-02-07

## 2024-02-07 PROBLEM — Z00.00 ENCOUNTER FOR PREVENTIVE HEALTH EXAMINATION: Status: ACTIVE | Noted: 2024-02-07

## 2024-10-14 NOTE — ED PROVIDER NOTE - INTERNATIONAL TRAVEL
Informed Consent for Blood Component Transfusion Note    I have discussed with the patient the rationale for blood component transfusion; its benefits in treating or preventing fatigue, organ damage, or death; and its risk which includes mild transfusion reactions, rare risk of blood borne infection, or more serious but rare reactions. I have discussed the alternatives to transfusion, including the risk and consequences of not receiving transfusion. The patient had an opportunity to ask questions and had agreed to proceed with transfusion of blood components.    Electronically signed by Zeb Escamilla MD on 10/14/24 at 8:39 AM RAFAT   No